# Patient Record
Sex: MALE | Race: WHITE
[De-identification: names, ages, dates, MRNs, and addresses within clinical notes are randomized per-mention and may not be internally consistent; named-entity substitution may affect disease eponyms.]

---

## 2017-07-18 ENCOUNTER — HOSPITAL ENCOUNTER (EMERGENCY)
Dept: HOSPITAL 80 - FED | Age: 77
Discharge: HOME | End: 2017-07-18
Payer: COMMERCIAL

## 2017-07-18 VITALS
HEART RATE: 82 BPM | RESPIRATION RATE: 18 BRPM | SYSTOLIC BLOOD PRESSURE: 140 MMHG | TEMPERATURE: 98.1 F | DIASTOLIC BLOOD PRESSURE: 90 MMHG | OXYGEN SATURATION: 94 %

## 2017-07-18 DIAGNOSIS — Z87.891: ICD-10-CM

## 2017-07-18 DIAGNOSIS — Z95.5: ICD-10-CM

## 2017-07-18 DIAGNOSIS — I10: ICD-10-CM

## 2017-07-18 DIAGNOSIS — E11.9: ICD-10-CM

## 2017-07-18 DIAGNOSIS — Z79.82: ICD-10-CM

## 2017-07-18 DIAGNOSIS — B96.89: ICD-10-CM

## 2017-07-18 DIAGNOSIS — N30.00: Primary | ICD-10-CM

## 2017-07-18 LAB
% IMMATURE GRANULYOCYTES: 0.1 % (ref 0–1.1)
ABSOLUTE IMMATURE GRANULOCYTES: 0.01 10^3/UL (ref 0–0.1)
ABSOLUTE NRBC COUNT: 0 10^3/UL (ref 0–0.01)
ADD DIFF?: NO
ADD MORPH?: NO
ADD SCAN?: NO
ANION GAP SERPL CALC-SCNC: 15 MEQ/L (ref 8–16)
ATYPICAL LYMPHOCYTE FLAG: 10 (ref 0–99)
BACTERIA #/AREA URNS HPF: (no result) /HPF
CALCIUM SERPL-MCNC: 9.9 MG/DL (ref 8.5–10.4)
CHLORIDE SERPL-SCNC: 103 MEQ/L (ref 97–110)
CO2 SERPL-SCNC: 22 MEQ/L (ref 22–31)
COLOR UR: (no result)
CREAT SERPL-MCNC: 1.1 MG/DL (ref 0.7–1.3)
ERYTHROCYTE [DISTWIDTH] IN BLOOD BY AUTOMATED COUNT: 13.2 % (ref 11.5–15.2)
FRAGMENT RBC FLAG: 0 (ref 0–99)
GFR SERPL CREATININE-BSD FRML MDRD: > 60 ML/MIN/{1.73_M2}
GLUCOSE SERPL-MCNC: 183 MG/DL (ref 70–100)
HCT VFR BLD CALC: 43.5 % (ref 40–51)
HGB BLD-MCNC: 14.9 G/DL (ref 13.7–17.5)
LEFT SHIFT FLG: 0 (ref 0–99)
LIPEMIA HEMOLYSIS FLAG: 90 (ref 0–99)
MCH RBC BLDCO QN: 31.3 PG (ref 27.9–34.1)
MCHC RBC AUTO-ENTMCNC: 34.3 G/DL (ref 32.4–36.7)
MCV RBC AUTO: 91.4 FL (ref 81.5–99.8)
MUCOUS THREADS #/AREA URNS LPF: (no result) /LPF
NITRITE UR QL STRIP: NEGATIVE
NRBC-AUTO%: 0 % (ref 0–0.2)
PH UR STRIP: 5 [PH] (ref 5–7.5)
PLATELET # BLD: 236 10^3/UL (ref 150–400)
PLATELET CLUMPS FLAG: 0 (ref 0–99)
PMV BLD AUTO: 9.8 FL (ref 8.7–11.7)
POTASSIUM SERPL-SCNC: 4.3 MEQ/L (ref 3.5–5.2)
RBC # BLD AUTO: 4.76 10^6/UL (ref 4.4–6.38)
RBC #/AREA URNS HPF: (no result) /HPF (ref 0–3)
SODIUM SERPL-SCNC: 140 MEQ/L (ref 134–144)
SP GR UR STRIP: 1.01 (ref 1–1.03)
TROPONIN I SERPL-MCNC: < 0.012 NG/ML (ref 0–0.03)
WBC #/AREA URNS HPF: (no result) /HPF (ref 0–3)

## 2017-07-18 RX ADMIN — ASPIRIN 81 MG ONE MG: 81 TABLET ORAL at 14:50

## 2017-07-18 RX ADMIN — ASPIRIN 81 MG ONE: 81 TABLET ORAL at 14:53

## 2017-07-18 NOTE — CPEKG
Heart Rate: 93

RR Interval: 645

P-R Interval: 176

QRSD Interval: 86

QT Interval: 348

QTC Interval: 433

P Axis: 74

QRS Axis: 22

T Wave Axis: 66

EKG Severity - BORDERLINE ECG -

EKG Impression: SINUS RHYTHM

EKG Impression: PROBABLE LEFT ATRIAL ABNORMALITY

Electronically Signed By: Mitesh Ortiz 18-Jul-2017 14:47:16

## 2017-07-18 NOTE — EDPHY
H & P


Stated Complaint: CP sob & dizzy for 3 wks


Time Seen by Provider: 07/18/17 14:33


HPI/ROS: 





HPI





CHIEF COMPLAINT:  [ ]





HISTORY OF PRESENT ILLNESS:  [Need 4:  Location, Duration, Severity, Quality, 

Context, Timing Modifying Factors, Associated S&S]





Past Medical History:





Past Surgical History:





Social History:





Family History:








ROS   


REVIEW OF SYSTEMS:


A comprehensive 10 point review of systems is otherwise negative aside from 

elements mentioned in the history of present illness.








Exam   


Constitutional   triage nursing summary reviewed, vital signs reviewed, awake/

alert. 


Eyes   normal conjunctivae and sclera, EOMI, PERRLA. 


HENT   normal inspection, atraumatic, moist mucus membranes, no epistaxis, neck 

supple/ no meningismus, no raccoon eyes. 


Respiratory   clear to auscultation bilaterally, normal breath sounds, no 

respiratory distress, no wheezing. 


Cardiovascular   rate normal, regular rhythm, no murmur, no edema, distal 

pulses normal. 


Gastrointestinal   soft, non-tender, no rebound, no guarding, normal bowel 

sounds, no distension, no pulsatile mass. 


Genitourinary   no CVA tenderness. 


Musculoskeletal  no midline vertebral tenderness, full range of motion, no calf 

swelling, no tenderness of extremities, no meningismus, good pulses, 

neurovascularly intact.


Skin   pink, warm, & dry, no rash, skin atraumatic. 


Neurologic   awake, alert and oriented x 3, AAOx3, moves all 4 extremities 

equally, motor intact, sensory intact, CN II-XII intact, normal cerebellar, 

normal vision, normal speech. 


Psychiatric   normal mood/affect. 


Heme/Lymph/Immune   no lymphadenopathy.





Differential Diagnosis:





Medical Decision Making:





Re-evaluation:











Source: Patient





- Personal History


Current Tetanus/Diphtheria Vaccine: Yes


Current Tetanus Diphtheria and Acellular Pertussis (TDAP): Yes


Tetanus Vaccine Date: < 10 years





- Medical/Surgical History


Hx Asthma: No


Hx Chronic Respiratory Disease: No


Hx Diabetes: Yes


Hx Cardiac Disease: Yes


Hx Renal Disease: No


Hx Cirrhosis: No


Hx Alcoholism: No


Hx HIV/AIDS: No


Hx Splenectomy or Spleen Trauma: No


Other PMH: BACK SURGERIES, HTN, NERVE DAMAGE, CHRONIC UTIS.  neurogenic bladder

, ALEX, CAD.  DM, spinal stenosis





- Social History


Smoking Status: Former smoker


Constitutional: 





 Initial Vital Signs











Temperature (C)  36.5 C   07/18/17 14:24


 


Heart Rate  85   07/18/17 14:24


 


Respiratory Rate  16   07/18/17 14:24


 


Blood Pressure  154/94 H  07/18/17 14:24


 


O2 Sat (%)  95   07/18/17 14:24








 











O2 Delivery Mode               Room Air














Allergies/Adverse Reactions: 


 





No Known Allergies Allergy (Verified 11/24/16 11:38)


 








Home Medications: 














 Medication  Instructions  Recorded


 


Aspirin [Aspirin 81mg (*)] 162 mg PO DAILY 02/10/15


 


Lisinopril/Hctz 20/12.5MG 1 tab PO DAILY 02/10/15





[Zestoretic/Prinzide 20/12.5MG (*)]  


 


Metoprolol Succinate Xr [Toprol Xl 25 mg PO DAILY@18 02/10/15





25 mg (*)]  


 


Simvastatin 40 mg PO HS 02/10/15


 


glipiZIDE XL [Glucotrol Xl] 5 mg PO DAILY 02/10/15


 


Diazepam [Valium 5 MG (*)] 5 mg PO DAILY PRN 11/24/16


 


Pantoprazole Sodium [Protonix 40mg 40 mg PO DAILY@07 11/24/16





(*)]  


 


Sulfamethox/Tmp 800/160 mg 1 tab PO BID PRN 11/24/16





[Bactrim DS]  


 


oxyCODONE IR [Oxycodone Ir (*)] 15 mg PO BID@14,21 11/24/16


 


Acetaminophen [Tylenol 325mg (*)] 650 mg PO Q4HRS PRN #0 tab 11/25/16














Departure





- Departure


Referrals: 


AVA Ybarra MD [Primary Care Provider] - As per Instructions

## 2017-07-18 NOTE — EDPHY
H & P


Time Seen by Provider: 07/18/17 14:33


HPI/ROS: 





CHIEF COMPLAINT:  Chest tightness





HISTORY OF PRESENT ILLNESS:  Patient had cardiac stenting 9 years ago by Dr. Palmer and tells me that over the past 3 weeks the symptoms that preceded his 

stent have all reoccurred.  He describes increasing fatigue and being tired.  

He feels dizzy frequently with a feeling of presyncope when he walks and 

actually fell down a couple weeks ago.  He is short of breath worse at night 

and has had some intermittent chest tightness last week that resolved with 2 

nitroglycerin.  None for the last 5 days.  Primarily here in the emergency 

department he is concerned that he was having chest tightness last week but now 

really feels just very tired and fatigued.  Not associated with any focal 

weakness or headache.  Symptoms moderate.





REVIEW OF SYSTEMS:


Eye: no change in vision


ENT: no sore throat


Cardiac:  HPI


Pulmonary:  No cough or hemoptysis


Abdomen: no vomiting, diarrhea, abdominal pain


Musculoskeletal:  Chronic back pain, better lying down


Skin: no rash


Neuro: no headache, chronic peripheral neuropathy after back surgery unchanged


Constitutional: no fever


:  Neurogenic bladder with condom catheter, unchanged.  No dysuria or 

hematuria.





A comprehensive 10 point review of systems is otherwise negative aside from 

elements mentioned in the history of present illness.





PAST MEDICAL HISTORY:  Coronary stenting as noted above, 3 back surgeries, 

hypertension, sleep apnea, diabetes, spinal stenosis.  Catheterization report 

by Dr. Palmer reviewed by myself from 11/28/15, negative angiogram for new 

stenosis.





Social history:  Primary care Aric Ybarra, here with wife, nonsmoker





General Appearance: Alert and conversant, cooperative.


Eyes: No scleral  icterus. 


ENT, Mouth: Normal mucous membranes.


Respiratory: Normal respiratory effort, breath sounds equal, lungs are clear to 

auscultation.


Cardiovascular:  Regular rate and rhythm. No murmur.


Gastrointestinal:  Abdomen is soft and non tender.


Neurological: Alert and oriented, conversant, follows commands.  Normal speech.

  Able to move all extremities, sensation and sensation intact to light touch.


Skin: Warm and dry, no rashes.


Musculoskeletal: No peripheral edema and no joint swelling. No calf tenderness.


Psychiatric: Not agitated.





Emergency Department course/MDM:


Symptoms concerning for being identical to his pre stent symptoms from a decade 

ago.  EKG does not show acute changes.  Oral aspirin, chest x-ray and labs to 

include troponin and D-dimer.  Pretest clinical likelihood of pulmonary 

embolism is low.





1555:  Discussed with Dr. Palmer.  Recommends discharge with office follow-up 

in regards to his cardiac risk.





Urinalysis reviewed and is positive for white blood cells and bacteria.  The 

patient tells me that he typically gets urinary tract infections without 

symptoms, and will often dipstick test his urine at home.  Usually he is 

dipstick negative. When he is positive his primary care physician will 

prescribe him Bactrim which he tolerates well and always seems to clear it up.


In reviewing his previous urine cultures including 2/10/2015 and 1/5/2015 he 

has had E coli which is resistant to cephalosporins and Macrodantin but is 

sensitive to Bactrim.  Prescription for Bactrim, urine culture sent.


Smoking Status: Former smoker


Constitutional: 


 Initial Vital Signs











Temperature (C)  36.5 C   07/18/17 14:24


 


Heart Rate  85   07/18/17 14:24


 


Respiratory Rate  16   07/18/17 14:24


 


Blood Pressure  154/94 H  07/18/17 14:24


 


O2 Sat (%)  95   07/18/17 14:24








 











O2 Delivery Mode               Room Air














Allergies/Adverse Reactions: 


 





No Known Allergies Allergy (Verified 11/24/16 11:38)


 








Home Medications: 














 Medication  Instructions  Recorded


 


Aspirin [Aspirin 81mg (*)] 162 mg PO DAILY 02/10/15


 


Lisinopril/Hctz 20/12.5MG 1 tab PO DAILY 02/10/15





[Zestoretic/Prinzide 20/12.5MG (*)]  


 


Metoprolol Succinate Xr [Toprol Xl 25 mg PO DAILY@18 02/10/15





25 mg (*)]  


 


Simvastatin 40 mg PO HS 02/10/15


 


glipiZIDE XL [Glucotrol Xl] 5 mg PO DAILY 02/10/15


 


Diazepam [Valium 5 MG (*)] 5 mg PO DAILY PRN 11/24/16


 


Pantoprazole Sodium [Protonix 40mg 40 mg PO DAILY@07 11/24/16





(*)]  


 


Sulfamethox/Tmp 800/160 mg 1 tab PO BID PRN 11/24/16





[Bactrim DS]  


 


oxyCODONE IR [Oxycodone Ir (*)] 15 mg PO BID@14,21 11/24/16


 


Acetaminophen [Tylenol 325mg (*)] 650 mg PO Q4HRS PRN #0 tab 11/25/16


 


Sulfamethox/Tmp 800/160 mg 1 tab PO BID@1000,2200 #20 tab 07/18/17





[Bactrim Ds]  














Medical Decision Making





- Diagnostics


EKG Interpretation: 





12-lead EKG interpreted by me; official reading is in trace master.  My 

interpretation is sinus rhythm rate 93 with left atrial abnormality and no 

acute ischemic changes.


Imaging Results: 


 Imaging Impressions





Chest X-Ray  07/18/17 14:42


Impression: Normal chest.











Differential Diagnosis: 





Differential diagnosis considered for chest pain including but not limited to 

myocardial ischemia, aortic dissection, pericarditis, pulmonary embolus, chest 

wall pain, pleural inflammation and pulmonary infectious causes.





- Data Points


Laboratory Results: 


 Laboratory Results





 07/18/17 14:38 





 07/18/17 14:38 





 











  07/18/17 07/18/17 07/18/17





  15:46 14:38 14:38


 


WBC      





    


 


RBC      





    


 


Hgb      





    


 


Hct      





    


 


MCV      





    


 


MCH      





    


 


MCHC      





    


 


RDW      





    


 


Plt Count      





    


 


MPV      





    


 


Neut % (Auto)      





    


 


Lymph % (Auto)      





    


 


Mono % (Auto)      





    


 


Eos % (Auto)      





    


 


Baso % (Auto)      





    


 


Nucleat RBC Rel Count      





    


 


Absolute Neuts (auto)      





    


 


Absolute Lymphs (auto)      





    


 


Absolute Monos (auto)      





    


 


Absolute Eos (auto)      





    


 


Absolute Basos (auto)      





    


 


Absolute Nucleated RBC      





    


 


Immature Gran %      





    


 


Immature Gran #      





    


 


D-Dimer    0.36 ug/mLFEU ug/mLFEU  





    (0.00-0.50)  


 


Sodium      140 mEq/L mEq/L





     (134-144) 


 


Potassium      4.3 mEq/L mEq/L





     (3.5-5.2) 


 


Chloride      103 mEq/L mEq/L





     () 


 


Carbon Dioxide      22 mEq/l mEq/l





     (22-31) 


 


Anion Gap      15 mEq/L mEq/L





     (8-16) 


 


BUN      22 mg/dL mg/dL





     (7-23) 


 


Creatinine      1.1 mg/dL mg/dL





     (0.7-1.3) 


 


Estimated GFR      > 60 





    


 


Glucose      183 mg/dL H mg/dL





     () 


 


Calcium      9.9 mg/dL mg/dL





     (8.5-10.4) 


 


Troponin I      < 0.012 ng/mL ng/mL





     (0-0.034) 


 


Urine Color  PALE YELLOW     





    


 


Urine Appearance  HAZY     





    


 


Urine pH  5.0     





   (5.0-7.5)   


 


Ur Specific Gravity  1.009     





   (1.002-1.030)   


 


Urine Protein  NEGATIVE     





   (NEGATIVE)   


 


Urine Ketones  NEGATIVE     





   (NEGATIVE)   


 


Urine Blood  2+  H     





   (NEGATIVE)   


 


Urine Nitrate  NEGATIVE     





   (NEGATIVE)   


 


Urine Bilirubin  NEGATIVE     





   (NEGATIVE)   


 


Urine Urobilinogen  NEGATIVE EU EU    





   (0.2-1.0)   


 


Ur Leukocyte Esterase  2+  H     





   (NEGATIVE)   


 


Urine RBC  3-5 /hpf H /hpf    





   (0-3)   


 


Urine WBC  5-10 /hpf H /hpf    





   (0-3)   


 


Ur Epithelial Cells  TRACE /lpf /lpf    





   (NONE-1+)   


 


Urine Bacteria  TRACE /hpf H /hpf    





   (NONE SEEN)   


 


Urine Mucus  TRACE /lpf /lpf    





   (NONE-1+)   


 


Urine Glucose  NEGATIVE     





   (NEGATIVE)   














  07/18/17





  14:38


 


WBC  6.82 10^3/uL 10^3/uL





   (3.80-9.50) 


 


RBC  4.76 10^6/uL 10^6/uL





   (4.40-6.38) 


 


Hgb  14.9 g/dL g/dL





   (13.7-17.5) 


 


Hct  43.5 % %





   (40.0-51.0) 


 


MCV  91.4 fL fL





   (81.5-99.8) 


 


MCH  31.3 pg pg





   (27.9-34.1) 


 


MCHC  34.3 g/dL g/dL





   (32.4-36.7) 


 


RDW  13.2 % %





   (11.5-15.2) 


 


Plt Count  236 10^3/uL 10^3/uL





   (150-400) 


 


MPV  9.8 fL fL





   (8.7-11.7) 


 


Neut % (Auto)  60.8 % %





   (39.3-74.2) 


 


Lymph % (Auto)  25.2 % %





   (15.0-45.0) 


 


Mono % (Auto)  12.3 % %





   (4.5-13.0) 


 


Eos % (Auto)  0.9 % %





   (0.6-7.6) 


 


Baso % (Auto)  0.7 % %





   (0.3-1.7) 


 


Nucleat RBC Rel Count  0.0 % %





   (0.0-0.2) 


 


Absolute Neuts (auto)  4.14 10^3/uL 10^3/uL





   (1.70-6.50) 


 


Absolute Lymphs (auto)  1.72 10^3/uL 10^3/uL





   (1.00-3.00) 


 


Absolute Monos (auto)  0.84 10^3/uL H 10^3/uL





   (0.30-0.80) 


 


Absolute Eos (auto)  0.06 10^3/uL 10^3/uL





   (0.03-0.40) 


 


Absolute Basos (auto)  0.05 10^3/uL 10^3/uL





   (0.02-0.10) 


 


Absolute Nucleated RBC  0.00 10^3/uL 10^3/uL





   (0-0.01) 


 


Immature Gran %  0.1 % %





   (0.0-1.1) 


 


Immature Gran #  0.01 10^3/uL 10^3/uL





   (0.00-0.10) 


 


D-Dimer  





  


 


Sodium  





  


 


Potassium  





  


 


Chloride  





  


 


Carbon Dioxide  





  


 


Anion Gap  





  


 


BUN  





  


 


Creatinine  





  


 


Estimated GFR  





  


 


Glucose  





  


 


Calcium  





  


 


Troponin I  





  


 


Urine Color  





  


 


Urine Appearance  





  


 


Urine pH  





  


 


Ur Specific Gravity  





  


 


Urine Protein  





  


 


Urine Ketones  





  


 


Urine Blood  





  


 


Urine Nitrate  





  


 


Urine Bilirubin  





  


 


Urine Urobilinogen  





  


 


Ur Leukocyte Esterase  





  


 


Urine RBC  





  


 


Urine WBC  





  


 


Ur Epithelial Cells  





  


 


Urine Bacteria  





  


 


Urine Mucus  





  


 


Urine Glucose  





  











Medications Given: 


 








Discontinued Medications





Aspirin (Aspirin)  324 mg PO EDNOW ONE


   Stop: 07/18/17 14:39


   Last Admin: 07/18/17 14:53 Dose:  Not Given


Aspirin (Aspirin)  324 mg PO EDNOW ONE


   Stop: 07/18/17 14:47


   Last Admin: 07/18/17 15:03 Dose:  324 mg


Sodium Chloride (Ns)  1,000 mls @ 0 mls/hr IV ONCE ONE; Wide Open


   PRN Reason: Protocol


   Stop: 07/18/17 14:39


   Last Admin: 07/18/17 14:51 Dose:  Not Given








Departure





- Departure


Disposition: Home, Routine, Self-Care


Clinical Impression: 


Fatigue


Qualifiers:


 Fatigue type: unspecified Qualified Code(s): R53.83 - Other fatigue





Urinary tract infection


Qualifiers:


 Urinary tract infection type: acute cystitis Hematuria presence: without 

hematuria Qualified Code(s): N30.00 - Acute cystitis without hematuria





Condition: Good


Instructions:  Urinary Tract Infection in Men (ED), Weakness (ED)


Additional Instructions: 


Return for worsening symptoms.  Your cardiologist will call you for follow-up 

this week


Referrals: 


AVA Ybarra MD [Primary Care Provider] - As per Instructions


Franklyn Palmer MD [Medical Doctor] - As per Instructions


Prescriptions: 


Sulfamethox/Tmp 800/160 mg [Bactrim Ds] 1 tab PO BID@1000,2200 #20 tab

## 2017-07-31 ENCOUNTER — HOSPITAL ENCOUNTER (EMERGENCY)
Dept: HOSPITAL 80 - FED | Age: 77
Discharge: HOME | End: 2017-07-31
Payer: COMMERCIAL

## 2017-07-31 VITALS
OXYGEN SATURATION: 94 % | HEART RATE: 105 BPM | SYSTOLIC BLOOD PRESSURE: 155 MMHG | RESPIRATION RATE: 16 BRPM | DIASTOLIC BLOOD PRESSURE: 93 MMHG | TEMPERATURE: 97.9 F

## 2017-07-31 DIAGNOSIS — E11.9: ICD-10-CM

## 2017-07-31 DIAGNOSIS — Z87.891: ICD-10-CM

## 2017-07-31 DIAGNOSIS — I10: Primary | ICD-10-CM

## 2017-07-31 DIAGNOSIS — I25.10: ICD-10-CM

## 2017-07-31 DIAGNOSIS — Z79.82: ICD-10-CM

## 2017-07-31 LAB
% IMMATURE GRANULYOCYTES: 0.4 % (ref 0–1.1)
ABSOLUTE IMMATURE GRANULOCYTES: 0.04 10^3/UL (ref 0–0.1)
ABSOLUTE NRBC COUNT: 0 10^3/UL (ref 0–0.01)
ADD DIFF?: NO
ADD MORPH?: NO
ADD SCAN?: NO
ALBUMIN SERPL-MCNC: 4.8 G/DL (ref 3.5–5)
ALP SERPL-CCNC: 72 IU/L (ref 38–126)
ALT SERPL-CCNC: 32 IU/L (ref 21–72)
ANION GAP SERPL CALC-SCNC: 15 MEQ/L (ref 8–16)
AST SERPL-CCNC: 20 IU/L (ref 17–59)
ATYPICAL LYMPHOCYTE FLAG: 10 (ref 0–99)
BILIRUB SERPL-MCNC: 0.7 MG/DL (ref 0.1–1.4)
BILIRUBIN-CONJUGATED: 0.3 MG/DL (ref 0–0.5)
BILIRUBIN-UNCONJUGATED: 0.4 MG/DL (ref 0–1.1)
CALCIUM SERPL-MCNC: 10 MG/DL (ref 8.5–10.4)
CHLORIDE SERPL-SCNC: 106 MEQ/L (ref 97–110)
CO2 SERPL-SCNC: 19 MEQ/L (ref 22–31)
COLOR UR: (no result)
CREAT SERPL-MCNC: 0.9 MG/DL (ref 0.7–1.3)
ERYTHROCYTE [DISTWIDTH] IN BLOOD BY AUTOMATED COUNT: 13.1 % (ref 11.5–15.2)
FRAGMENT RBC FLAG: 0 (ref 0–99)
GFR SERPL CREATININE-BSD FRML MDRD: > 60 ML/MIN/{1.73_M2}
GLUCOSE SERPL-MCNC: 162 MG/DL (ref 70–100)
HCT VFR BLD CALC: 43 % (ref 40–51)
HGB BLD-MCNC: 14.6 G/DL (ref 13.7–17.5)
LEFT SHIFT FLG: 0 (ref 0–99)
LIPEMIA HEMOLYSIS FLAG: 90 (ref 0–99)
MCH RBC BLDCO QN: 30.6 PG (ref 27.9–34.1)
MCHC RBC AUTO-ENTMCNC: 34 G/DL (ref 32.4–36.7)
MCV RBC AUTO: 90.1 FL (ref 81.5–99.8)
NITRITE UR QL STRIP: NEGATIVE
NRBC-AUTO%: 0 % (ref 0–0.2)
PH UR STRIP: 5 [PH] (ref 5–7.5)
PLATELET # BLD: 221 10^3/UL (ref 150–400)
PLATELET CLUMPS FLAG: 0 (ref 0–99)
PMV BLD AUTO: 9.9 FL (ref 8.7–11.7)
POTASSIUM SERPL-SCNC: 4.1 MEQ/L (ref 3.5–5.2)
PROT SERPL-MCNC: 7.9 G/DL (ref 6.3–8.2)
RBC # BLD AUTO: 4.77 10^6/UL (ref 4.4–6.38)
RBC #/AREA URNS HPF: (no result) /HPF (ref 0–3)
SODIUM SERPL-SCNC: 140 MEQ/L (ref 134–144)
SP GR UR STRIP: 1 (ref 1–1.03)
TROPONIN I SERPL-MCNC: < 0.012 NG/ML (ref 0–0.03)
WBC #/AREA URNS HPF: (no result) /HPF (ref 0–3)

## 2017-07-31 PROCEDURE — 71020: CPT

## 2017-07-31 PROCEDURE — 93005 ELECTROCARDIOGRAM TRACING: CPT

## 2017-07-31 PROCEDURE — 96375 TX/PRO/DX INJ NEW DRUG ADDON: CPT

## 2017-07-31 PROCEDURE — 96374 THER/PROPH/DIAG INJ IV PUSH: CPT

## 2017-07-31 PROCEDURE — 99285 EMERGENCY DEPT VISIT HI MDM: CPT

## 2017-07-31 NOTE — CPEKG
Heart Rate: 94

RR Interval: 638

P-R Interval: 212

QRSD Interval: 82

QT Interval: 372

QTC Interval: 466

P Axis: 61

QRS Axis: 5

T Wave Axis: 59

EKG Severity - ABNORMAL ECG -

EKG Impression: SINUS RHYTHM

EKG Impression: VENTRICULAR PREMATURE COMPLEX

EKG Impression: CONSIDER POSTERIOR INFARCT

Electronically Signed By: Carri Ware 31-Jul-2017 20:58:24

## 2017-07-31 NOTE — EDPHY
H & P


Stated Complaint: chest pain today, back pain chronic, bladder pain and 

dizziness since march


Time Seen by Provider: 17 15:35


HPI/ROS: 





Chief Complaint:  Lightheaded, short of breath, headache, hypertension





HPI:  76-year-old male with a history of coronary artery disease and 

hypertension is complaining of a headache, lightheadedness, feeling flushed 

which started about 10:00 a.m. this morning.   He states that last night he has 

head became feeling hot and had a mild headache.  At that time his blood 

pressure was 180/90.  He sat in his chair and felt better.  This morning at 10:

00 a.m. he says his ankle started feeling numb and he felt like he was going to 

fall but did not.  Again at noon his head started feeling flushed he felt some 

shortness of breath and some lightheadedness.  At that time his blood pressure 

was 190/85.  He has been compliant with his medications.  Denies any substernal 

chest pain but has have a little bit left-sided chest pain and feels that he 

can not get catch his breath. He states he has been having worsening shortness 

of breath the last 3 or 4 months but for the last 2 days it is been worse to 

the point that he feels like he is unable to ever catch his breath.  He is 

lightheaded.  He is scheduled for a CT scan of his head today because he has 

been having dizziness but was unable to go to have it done.  Denies any fevers 

or chills.  Does have a history of chronic back pain for which when he has 

exacerbations of pain he has to straight cath.  States at 1:30 p.m. this 

afternoon he self cath and had flow for about a minute.  He then removed.  An 

hour later he felt the urge to void again put in a catheter and had a 1 minutes 

45 seconds.  He has been having increasing urgency.  No fevers or chills.  No 

abdominal pain. No nausea or vomiting.





ROS:  10 point Review of Systems is negative except as noted in the HPI.





PMH:  Coronary artery disease, hypertension, diabetes, chronic back pain





Medications:  Lisinopril/hydrochlorothiazide


Simvastatin


Metoprolol


Glipizide


Pantoprazole


Aspirin could


Oxycodone





Allergies:  Metformin





Social History: No smoking, no alcohol,  no recreational drug use





Family History:  Father had a CVA and  at 72, also had diabetes.  Mother 

 at 76 for coronary artery disease.





Physical Exam:


Gen: Awake, Alert, anxious appearing


HEENT:  


     Nose: no rhinorrhea


     Eyes: PERRLA, EOMI


     Mouth: Moist mucosa 


Neck: Supple, no JVD


Chest: nontender, lungs clear to auscultation


Heart: S1, S2 normal, no murmur


Abd: Soft, non-tender, no guarding


Back: no CVA tenderness, no midline tenderness 


Ext:  1+ foot edema, non-tender


Skin: no rash


Neuro: CN II-XII intact, Sensation grossly intact, Strength 5/5 in bilateral 

upper and lower extremities








- Personal History


Current Tetanus Diphtheria and Acellular Pertussis (TDAP): Yes


Tetanus Vaccine Date: < 10 years





- Medical/Surgical History


Hx Asthma: No


Hx Chronic Respiratory Disease: No


Hx Diabetes: Yes


Hx Cardiac Disease: Yes


Hx Renal Disease: No


Hx Cirrhosis: No


Hx Alcoholism: No


Hx HIV/AIDS: No


Hx Splenectomy or Spleen Trauma: No


Other PMH: BACK SURGERIES, HTN, NERVE DAMAGE, CHRONIC UTIS.  neurogenic bladder

, ALEX, CAD.  DM, spinal stenosis





- Social History


Smoking Status: Former smoker


Constitutional: 


 Initial Vital Signs











Heart Rate  705 H  17 15:27


 


Respiratory Rate  18   17 15:27


 


Blood Pressure  175/81 H  17 15:27


 


O2 Sat (%)  97   17 15:27








 











O2 Delivery Mode               Room Air














Allergies/Adverse Reactions: 


 





metformin Allergy (Verified 17 15:26)


 








Home Medications: 














 Medication  Instructions  Recorded


 


Aspirin [Aspirin 81mg (*)] 162 mg PO DAILY 02/10/15


 


Lisinopril/Hctz 20/12.5MG 1 tab PO DAILY 02/10/15





[Zestoretic/Prinzide 20/12.5MG (*)]  


 


Metoprolol Succinate Xr [Toprol Xl 25 mg PO DAILY@18 02/10/15





25 mg (*)]  


 


Simvastatin 40 mg PO HS 02/10/15


 


glipiZIDE XL [Glucotrol Xl] 5 mg PO DAILY 02/10/15


 


Diazepam [Valium 5 MG (*)] 5 mg PO DAILY PRN 16


 


Pantoprazole Sodium [Protonix 40mg 40 mg PO DAILY@16





(*)]  


 


oxyCODONE IR [Oxycodone Ir (*)] 15 mg PO BID@14,21 16


 


Acetaminophen [Tylenol 325mg (*)] 650 mg PO Q4HRS PRN #0 tab 16














Medical Decision Making





- Diagnostics


Imaging Results: 


 Imaging Impressions





Chest X-Ray  17 15:50


Impression: Negative chest.











Imaging: I viewed and interpreted images myself


ED Course/Re-evaluation: 





76-year-old male with multiple complaints including some lightheadedness.  Is 

some flushing.  Some headache. All these are associated with his blood pressure 

being high.  He has similar episodes in the past and admitted to this hospital 

in January and was seen here 2 weeks ago.  Those workups were unremarkable. 

Today his blood pressure was 170 systolic here.  His ECG is unremarkable.  His 

blood work is negative. His urinalysis is negative.  Patient states that when 

he had a similar episode in the past he was given Valium which brought him 

almost immediate relief.  He has no focal neurologic deficits at this time.  No 

central chest pain.  Troponin is negative.  He has no headache or any other 

findings suggestive of end-organ damage secondary to his hypertension.  Blood 

pressure came down to 158 without treatment here.  Will give small dose of 

Ativan here and reassess.  At this time I do not see any findings to suggest 

that he needs further inpatient workup.  He has an appoint with his urologist 

tomorrow.  He is scheduled for a CT scan of his head later this week.  He has 

no other headache or any other findings to indicate emergent CT scan at this 

point.  If patient is improved and ambulating plan will be to discharge. He 

will follow up with Dr. Palmer as an outpatient.  He has been encouraged to 

return to the emergency department any time for any concerns.





Patient is feeling better after Ativan.  He has a negative workup at this time.

  Plan will be for discharge. He has an appointment with his urologist 

tomorrow.  Will follow with primary care physician and his cardiologist for 

further evaluation of his hypertension.





- Data Points


Laboratory Results: 


 Laboratory Results





 17 15:45 





 17 15:45 





 











  17





  16:50 15:45 15:45


 


WBC      10.48 10^3/uL H 10^3/uL





     (3.80-9.50) 


 


RBC      4.77 10^6/uL 10^6/uL





     (4.40-6.38) 


 


Hgb      14.6 g/dL g/dL





     (13.7-17.5) 


 


Hct      43.0 % %





     (40.0-51.0) 


 


MCV      90.1 fL fL





     (81.5-99.8) 


 


MCH      30.6 pg pg





     (27.9-34.1) 


 


MCHC      34.0 g/dL g/dL





     (32.4-36.7) 


 


RDW      13.1 % %





     (11.5-15.2) 


 


Plt Count      221 10^3/uL 10^3/uL





     (150-400) 


 


MPV      9.9 fL fL





     (8.7-11.7) 


 


Neut % (Auto)      76.7 % H %





     (39.3-74.2) 


 


Lymph % (Auto)      13.4 % L %





     (15.0-45.0) 


 


Mono % (Auto)      8.8 % %





     (4.5-13.0) 


 


Eos % (Auto)      0.3 % L %





     (0.6-7.6) 


 


Baso % (Auto)      0.4 % %





     (0.3-1.7) 


 


Nucleat RBC Rel Count      0.0 % %





     (0.0-0.2) 


 


Absolute Neuts (auto)      8.05 10^3/uL H 10^3/uL





     (1.70-6.50) 


 


Absolute Lymphs (auto)      1.40 10^3/uL 10^3/uL





     (1.00-3.00) 


 


Absolute Monos (auto)      0.92 10^3/uL H 10^3/uL





     (0.30-0.80) 


 


Absolute Eos (auto)      0.03 10^3/uL 10^3/uL





     (0.03-0.40) 


 


Absolute Basos (auto)      0.04 10^3/uL 10^3/uL





     (0.02-0.10) 


 


Absolute Nucleated RBC      0.00 10^3/uL 10^3/uL





     (0-0.01) 


 


Immature Gran %      0.4 % %





     (0.0-1.1) 


 


Immature Gran #      0.04 10^3/uL 10^3/uL





     (0.00-0.10) 


 


Sodium    140 mEq/L mEq/L  





    (134-144)  


 


Potassium    4.1 mEq/L mEq/L  





    (3.5-5.2)  


 


Chloride    106 mEq/L mEq/L  





    ()  


 


Carbon Dioxide    19 mEq/l L mEq/l  





    (22-31)  


 


Anion Gap    15 mEq/L mEq/L  





    (8-16)  


 


BUN    17 mg/dL mg/dL  





    (7-23)  


 


Creatinine    0.9 mg/dL mg/dL  





    (0.7-1.3)  


 


Estimated GFR    > 60   





    


 


Glucose    162 mg/dL H mg/dL  





    ()  


 


Calcium    10.0 mg/dL mg/dL  





    (8.5-10.4)  


 


Total Bilirubin    0.7 mg/dL mg/dL  





    (0.1-1.4)  


 


Conjugated Bilirubin    0.3 mg/dL mg/dL  





    (0.0-0.5)  


 


Unconjugated Bilirubin    0.4 mg/dL mg/dL  





    (0.0-1.1)  


 


AST    20 IU/L IU/L  





    (17-59)  


 


ALT    32 IU/L IU/L  





    (21-72)  


 


Alkaline Phosphatase    72 IU/L IU/L  





    ()  


 


Troponin I    < 0.012 ng/mL ng/mL  





    (0-0.034)  


 


Total Protein    7.9 g/dL g/dL  





    (6.3-8.2)  


 


Albumin    4.8 g/dL g/dL  





    (3.5-5.0)  


 


Lipase    57.0 IU/L IU/L  





    ()  


 


Urine Color  PALE YELLOW     





    


 


Urine Appearance  CLEAR     





    


 


Urine pH  5.0     





   (5.0-7.5)   


 


Ur Specific Gravity  1.004     





   (1.002-1.030)   


 


Urine Protein  NEGATIVE     





   (NEGATIVE)   


 


Urine Ketones  NEGATIVE     





   (NEGATIVE)   


 


Urine Blood  2+  H     





   (NEGATIVE)   


 


Urine Nitrate  NEGATIVE     





   (NEGATIVE)   


 


Urine Bilirubin  NEGATIVE     





   (NEGATIVE)   


 


Urine Urobilinogen  NEGATIVE EU EU    





   (0.2-1.0)   


 


Ur Leukocyte Esterase  NEGATIVE     





   (NEGATIVE)   


 


Urine RBC  1-3 /hpf /hpf    





   (0-3)   


 


Urine WBC  1-3 /hpf /hpf    





   (0-3)   


 


Ur Epithelial Cells  TRACE /lpf /lpf    





   (NONE-1+)   


 


Urine Glucose  NEGATIVE     





   (NEGATIVE)   











Medications Given: 


 








Discontinued Medications





Lorazepam (Ativan Injection)  1 mg IVP EDNOW ONE


   Stop: 17 17:33


   Last Admin: 17 17:46 Dose:  1 mg


Morphine Sulfate (Morphine)  4 mg IVP EDNOW ONE


   Stop: 17 16:14


   Last Admin: 17 16:15 Dose:  4 mg








Departure





- Departure


Disposition: Home, Routine, Self-Care


Clinical Impression: 


 Hypertension





Condition: Good


Instructions:  Hypertension (ED)


Additional Instructions: 


Follow up with your primary care physician in 2-3 days.


Follow up with your urologist tomorrow as scheduled.


Follow up with your cardiologist in 3-4 days, call for next available 

appointment.


Return to the emergency department for worsening headache, chest pain, 

worsening difficulty breathing, abdominal pain, fevers, chills, or any other 

concerns.


Referrals: 


AVA Ybarra MD [Primary Care Provider] - As per Instructions


Franklyn Palmer MD [Medical Doctor] - As per Instructions

## 2018-01-16 ENCOUNTER — HOSPITAL ENCOUNTER (OUTPATIENT)
Dept: HOSPITAL 80 - BHFA | Age: 78
End: 2018-01-16
Attending: INTERNAL MEDICINE
Payer: COMMERCIAL

## 2018-01-16 DIAGNOSIS — I25.10: Primary | ICD-10-CM
